# Patient Record
Sex: FEMALE | Race: OTHER | URBAN - METROPOLITAN AREA
[De-identification: names, ages, dates, MRNs, and addresses within clinical notes are randomized per-mention and may not be internally consistent; named-entity substitution may affect disease eponyms.]

---

## 2018-04-19 ENCOUNTER — EMERGENCY (EMERGENCY)
Facility: HOSPITAL | Age: 68
LOS: 1 days | Discharge: HOME | End: 2018-04-19
Attending: EMERGENCY MEDICINE | Admitting: EMERGENCY MEDICINE
Payer: MEDICAID

## 2018-04-19 VITALS
RESPIRATION RATE: 18 BRPM | DIASTOLIC BLOOD PRESSURE: 73 MMHG | SYSTOLIC BLOOD PRESSURE: 193 MMHG | OXYGEN SATURATION: 100 % | HEART RATE: 66 BPM | TEMPERATURE: 98 F

## 2018-04-19 PROCEDURE — 99285 EMERGENCY DEPT VISIT HI MDM: CPT | Mod: 25

## 2018-04-19 PROCEDURE — 99053 MED SERV 10PM-8AM 24 HR FAC: CPT

## 2018-04-19 NOTE — ED ADULT TRIAGE NOTE - LANGUAGE ASSISTANCE NEEDED
No-Patient/Caregiver offered and refused free interpretation services./triaged in primary language, family to translate

## 2018-04-19 NOTE — ED ADULT TRIAGE NOTE - CHIEF COMPLAINT QUOTE
Pt Yakut speaking, family to translate arrives c/o high BP, feeling tired, and pressure/tightness in chest, making it feel harder to breathe.  Also reports abd distention, bloating, nbnb vomiting 3x pta. Resps even/unlabored on RA, appearing in no distress. PMHx HTN, DM2, Gastritis. BP high in triage, took meds as rx'd.  EKG obtained

## 2018-04-20 VITALS
DIASTOLIC BLOOD PRESSURE: 59 MMHG | SYSTOLIC BLOOD PRESSURE: 185 MMHG | TEMPERATURE: 98 F | RESPIRATION RATE: 18 BRPM | OXYGEN SATURATION: 100 % | HEART RATE: 67 BPM

## 2018-04-20 LAB
ALBUMIN SERPL ELPH-MCNC: 4 G/DL — SIGNIFICANT CHANGE UP (ref 3.3–5)
ALP SERPL-CCNC: 101 U/L — SIGNIFICANT CHANGE UP (ref 40–120)
ALT FLD-CCNC: 23 U/L — SIGNIFICANT CHANGE UP (ref 4–33)
APPEARANCE UR: CLEAR — SIGNIFICANT CHANGE UP
AST SERPL-CCNC: 21 U/L — SIGNIFICANT CHANGE UP (ref 4–32)
BASE EXCESS BLDV CALC-SCNC: 5.4 MMOL/L — SIGNIFICANT CHANGE UP
BASOPHILS # BLD AUTO: 0.04 K/UL — SIGNIFICANT CHANGE UP (ref 0–0.2)
BASOPHILS NFR BLD AUTO: 0.7 % — SIGNIFICANT CHANGE UP (ref 0–2)
BILIRUB SERPL-MCNC: 0.5 MG/DL — SIGNIFICANT CHANGE UP (ref 0.2–1.2)
BILIRUB UR-MCNC: NEGATIVE — SIGNIFICANT CHANGE UP
BLOOD GAS VENOUS - CREATININE: 0.66 MG/DL — SIGNIFICANT CHANGE UP (ref 0.5–1.3)
BLOOD UR QL VISUAL: NEGATIVE — SIGNIFICANT CHANGE UP
BUN SERPL-MCNC: 17 MG/DL — SIGNIFICANT CHANGE UP (ref 7–23)
CALCIUM SERPL-MCNC: 9.6 MG/DL — SIGNIFICANT CHANGE UP (ref 8.4–10.5)
CHLORIDE BLDV-SCNC: 99 MMOL/L — SIGNIFICANT CHANGE UP (ref 96–108)
CHLORIDE SERPL-SCNC: 90 MMOL/L — LOW (ref 98–107)
CO2 SERPL-SCNC: 26 MMOL/L — SIGNIFICANT CHANGE UP (ref 22–31)
COLOR SPEC: SIGNIFICANT CHANGE UP
CREAT SERPL-MCNC: 0.62 MG/DL — SIGNIFICANT CHANGE UP (ref 0.5–1.3)
EOSINOPHIL # BLD AUTO: 0.23 K/UL — SIGNIFICANT CHANGE UP (ref 0–0.5)
EOSINOPHIL NFR BLD AUTO: 3.8 % — SIGNIFICANT CHANGE UP (ref 0–6)
GAS PNL BLDV: 127 MMOL/L — LOW (ref 136–146)
GLUCOSE BLDV-MCNC: 161 — HIGH (ref 70–99)
GLUCOSE SERPL-MCNC: 165 MG/DL — HIGH (ref 70–99)
GLUCOSE UR-MCNC: NEGATIVE — SIGNIFICANT CHANGE UP
HCO3 BLDV-SCNC: 27 MMOL/L — SIGNIFICANT CHANGE UP (ref 20–27)
HCT VFR BLD CALC: 35 % — SIGNIFICANT CHANGE UP (ref 34.5–45)
HCT VFR BLDV CALC: 37.2 % — SIGNIFICANT CHANGE UP (ref 34.5–45)
HGB BLD-MCNC: 11.4 G/DL — LOW (ref 11.5–15.5)
HGB BLDV-MCNC: 12.1 G/DL — SIGNIFICANT CHANGE UP (ref 11.5–15.5)
IMM GRANULOCYTES # BLD AUTO: 0.02 # — SIGNIFICANT CHANGE UP
IMM GRANULOCYTES NFR BLD AUTO: 0.3 % — SIGNIFICANT CHANGE UP (ref 0–1.5)
KETONES UR-MCNC: NEGATIVE — SIGNIFICANT CHANGE UP
LACTATE BLDV-MCNC: 0.8 MMOL/L — SIGNIFICANT CHANGE UP (ref 0.5–2)
LEUKOCYTE ESTERASE UR-ACNC: NEGATIVE — SIGNIFICANT CHANGE UP
LIDOCAIN IGE QN: 42 U/L — SIGNIFICANT CHANGE UP (ref 7–60)
LYMPHOCYTES # BLD AUTO: 1.73 K/UL — SIGNIFICANT CHANGE UP (ref 1–3.3)
LYMPHOCYTES # BLD AUTO: 28.3 % — SIGNIFICANT CHANGE UP (ref 13–44)
MCHC RBC-ENTMCNC: 27.6 PG — SIGNIFICANT CHANGE UP (ref 27–34)
MCHC RBC-ENTMCNC: 32.6 % — SIGNIFICANT CHANGE UP (ref 32–36)
MCV RBC AUTO: 84.7 FL — SIGNIFICANT CHANGE UP (ref 80–100)
MONOCYTES # BLD AUTO: 0.73 K/UL — SIGNIFICANT CHANGE UP (ref 0–0.9)
MONOCYTES NFR BLD AUTO: 11.9 % — SIGNIFICANT CHANGE UP (ref 2–14)
NEUTROPHILS # BLD AUTO: 3.36 K/UL — SIGNIFICANT CHANGE UP (ref 1.8–7.4)
NEUTROPHILS NFR BLD AUTO: 55 % — SIGNIFICANT CHANGE UP (ref 43–77)
NITRITE UR-MCNC: NEGATIVE — SIGNIFICANT CHANGE UP
NON-SQ EPI CELLS # UR AUTO: <1 — SIGNIFICANT CHANGE UP
NRBC # FLD: 0 — SIGNIFICANT CHANGE UP
NT-PROBNP SERPL-SCNC: 260.4 PG/ML — SIGNIFICANT CHANGE UP
PCO2 BLDV: 50 MMHG — SIGNIFICANT CHANGE UP (ref 41–51)
PH BLDV: 7.4 PH — SIGNIFICANT CHANGE UP (ref 7.32–7.43)
PH UR: 7 — SIGNIFICANT CHANGE UP (ref 4.6–8)
PLATELET # BLD AUTO: 217 K/UL — SIGNIFICANT CHANGE UP (ref 150–400)
PMV BLD: 10.4 FL — SIGNIFICANT CHANGE UP (ref 7–13)
PO2 BLDV: < 24 MMHG — LOW (ref 35–40)
POTASSIUM BLDV-SCNC: 4.4 MMOL/L — SIGNIFICANT CHANGE UP (ref 3.4–4.5)
POTASSIUM SERPL-MCNC: 4.6 MMOL/L — SIGNIFICANT CHANGE UP (ref 3.5–5.3)
POTASSIUM SERPL-SCNC: 4.6 MMOL/L — SIGNIFICANT CHANGE UP (ref 3.5–5.3)
PROT SERPL-MCNC: 7.5 G/DL — SIGNIFICANT CHANGE UP (ref 6–8.3)
PROT UR-MCNC: 20 MG/DL — SIGNIFICANT CHANGE UP
RBC # BLD: 4.13 M/UL — SIGNIFICANT CHANGE UP (ref 3.8–5.2)
RBC # FLD: 12.2 % — SIGNIFICANT CHANGE UP (ref 10.3–14.5)
RBC CASTS # UR COMP ASSIST: SIGNIFICANT CHANGE UP (ref 0–?)
SAO2 % BLDV: 29.4 % — LOW (ref 60–85)
SODIUM SERPL-SCNC: 126 MMOL/L — LOW (ref 135–145)
SP GR SPEC: 1.01 — SIGNIFICANT CHANGE UP (ref 1–1.04)
TROPONIN T SERPL-MCNC: < 0.06 NG/ML — SIGNIFICANT CHANGE UP (ref 0–0.06)
UROBILINOGEN FLD QL: NORMAL MG/DL — SIGNIFICANT CHANGE UP
WBC # BLD: 6.11 K/UL — SIGNIFICANT CHANGE UP (ref 3.8–10.5)
WBC # FLD AUTO: 6.11 K/UL — SIGNIFICANT CHANGE UP (ref 3.8–10.5)
WBC UR QL: SIGNIFICANT CHANGE UP (ref 0–?)

## 2018-04-20 PROCEDURE — 76705 ECHO EXAM OF ABDOMEN: CPT | Mod: 26

## 2018-04-20 RX ORDER — ONDANSETRON 8 MG/1
4 TABLET, FILM COATED ORAL ONCE
Qty: 0 | Refills: 0 | Status: COMPLETED | OUTPATIENT
Start: 2018-04-20 | End: 2018-04-20

## 2018-04-20 RX ORDER — FAMOTIDINE 10 MG/ML
20 INJECTION INTRAVENOUS ONCE
Qty: 0 | Refills: 0 | Status: COMPLETED | OUTPATIENT
Start: 2018-04-20 | End: 2018-04-20

## 2018-04-20 RX ADMIN — Medication 30 MILLILITER(S): at 03:39

## 2018-04-20 RX ADMIN — FAMOTIDINE 20 MILLIGRAM(S): 10 INJECTION INTRAVENOUS at 03:39

## 2018-04-20 RX ADMIN — ONDANSETRON 4 MILLIGRAM(S): 8 TABLET, FILM COATED ORAL at 03:39

## 2018-04-20 NOTE — ED PROVIDER NOTE - PHYSICAL EXAMINATION
Adri Cervantes DO.:   GENERAL: Patient awake alert NAD.  HEENT: Moist mucous membranes, EOMI  LUNGS: CTAB, no wheezes or crackles.   CARDIAC: RRR, no m/r/g.    ABDOMEN: Soft, +tender in epigastrium, ND, No rebound, guarding. No CVA tenderness.   EXT: No edema. No calf tenderness. CV 2+DP/PT bilaterally.   NEURO: A&Ox3. Gait normal.    SKIN: Warm and dry.

## 2018-04-20 NOTE — ED ADULT NURSE NOTE - OBJECTIVE STATEMENT
67F A&Ox4 received #9 Romansh speaking family preferred to translate.  Pt c/o 4 days of intermittent substernal chest pressure/epigastric pain.  Pain is non-radiating, non-exertional.  18G R ac

## 2018-04-20 NOTE — ED PROVIDER NOTE - CARE PLAN
Principal Discharge DX:	Chest pain  Assessment and plan of treatment:	1. TAKE ALL MEDICATIONS AS DIRECTED.    2. FOR PAIN OR FEVER YOU CAN TAKE IBUPROFEN (MOTRIN, ADVIL) OR ACETAMINOPHEN (TYLENOL) AS NEEDED, AS DIRECTED ON PACKAGING.  3. FOLLOW UP WITH YOUR PRIMARY DOCTOR WITHIN 5 DAYS AS DIRECTED.  4. IF YOU HAD LABS OR IMAGING DONE, YOU WERE GIVEN COPIES OF ALL LABS AND/OR IMAGING RESULTS FROM YOUR ER VISIT--PLEASE TAKE THEM WITH YOU TO YOUR FOLLOW UP APPOINTMENTS.  5. IF NEEDED, CALL PATIENT ACCESS SERVICES AT [phone] TO FIND A PRIMARY CARE PHYSICIAN.  OR CALL [phone] TO MAKE AN APPOINTMENT WITH THE CLINIC.  6. RETURN TO THE ER FOR ANY WORSENING SYMPTOMS OR CONCERNS.

## 2018-04-20 NOTE — ED PROVIDER NOTE - PROGRESS NOTE DETAILS
Resident, Blinder: patient feeling well after the pepcid/maalox. Is refusing to stay for second trop. Patient looks well, will send home with cardiology and GI referral lists. Resident, Johner: patient feeling well after the pepcid/maalox. Is refusing to stay for second trop, understands risks involved. Patient looks well, will send home with cardiology and GI referral lists.

## 2018-04-20 NOTE — ED PROVIDER NOTE - PLAN OF CARE
1. TAKE ALL MEDICATIONS AS DIRECTED.    2. FOR PAIN OR FEVER YOU CAN TAKE IBUPROFEN (MOTRIN, ADVIL) OR ACETAMINOPHEN (TYLENOL) AS NEEDED, AS DIRECTED ON PACKAGING.  3. FOLLOW UP WITH YOUR PRIMARY DOCTOR WITHIN 5 DAYS AS DIRECTED.  4. IF YOU HAD LABS OR IMAGING DONE, YOU WERE GIVEN COPIES OF ALL LABS AND/OR IMAGING RESULTS FROM YOUR ER VISIT--PLEASE TAKE THEM WITH YOU TO YOUR FOLLOW UP APPOINTMENTS.  5. IF NEEDED, CALL PATIENT ACCESS SERVICES AT [phone] TO FIND A PRIMARY CARE PHYSICIAN.  OR CALL [phone] TO MAKE AN APPOINTMENT WITH THE CLINIC.  6. RETURN TO THE ER FOR ANY WORSENING SYMPTOMS OR CONCERNS.

## 2018-04-20 NOTE — ED PROVIDER NOTE - MEDICAL DECISION MAKING DETAILS
67F p/w chest pain/SOB and N/V. ACS r/o vs. GERD vs. [place] vs. Pancreatitis. Unlikely HTN Urgency as lower BP, but will watch and reassess. D/c vs. admit for cardiac w/u.

## 2018-04-20 NOTE — ED PROVIDER NOTE - NS ED ROS FT
CONST: no fevers, no chills  EYES: no pain, no vision changes  ENT: no sore throat, no ear pain, no change in hearing  CV: +chest pain, no leg swelling  RESP: +shortness of breath, no cough  ABD: +abdominal pain, +nausea, +vomiting, no diarrhea  : no dysuria, no flank pain, no hematuria  MSK: no back pain, no extremity pain  NEURO: no headache or additional neurologic complaints  HEME: no easy bleeding  SKIN:  no rash

## 2018-04-20 NOTE — ED PROVIDER NOTE - OBJECTIVE STATEMENT
67F h/o HTN, DM presents with 4 days of intermittent chest pressure, non-radiating, non-exertional, associated with SOB and epigastric pain 67F h/o HTN, DM presents with 4 days of intermittent substernal chest pressure, non-radiating, non-exertional, associated with SOB and epigastric pain, non-radiating and NBNB vomit - one episode. No fevers, headache, leg swelling, diarrhea. No recent travel. 67F h/o HTN, DM presents with 4 days of intermittent substernal chest pressure, non-radiating, non-exertional, associated with SOB and epigastric pain, non-radiating and NBNB vomit - one episode. No fevers, headache, leg swelling, diarrhea. No recent travel.  Family translating at bedside, requested by patient. 67F h/o HTN, DM presents with 4 days of intermittent epigastric pain and substernal chest pain non-radiating, non-exertional, associated with NBNB vomit - one episode.  Sxs intermittent, worse after meals.  States it feels like her gastritis.  Pt used to follow with GI, last EGD 5 years ago, but has not seen them recently.  No fevers, headache, leg swelling, diarrhea. No recent travel.    Pt is primarily Estonian speaking.  Family translating at bedside, requested by patient.

## 2018-04-20 NOTE — ED PROVIDER NOTE - ATTENDING CONTRIBUTION TO CARE
66 y/o F with h/o HTN, DM, gastritis here with epigastric and chest pain.  Pt reports sxs intermittent x 4 days, worse after meals.  Associated with 1 episode of nbnb vomiting earlier today.  No fever, chills, cough, sob, back pain, palpitations, diarrhea, leg pain or swelling.  Pt does report chronic constipation, last BM earlier today.  She use to follow with GI for these sxs, but has not seen in a while.  Last EGD 5 years ago showing gastritis per family.  Well appearing, lying comfortably in stretcher, awake and alert, nontoxic.  VSS.  Lungs cta bl.  Cards nl S1/S2, RRR, no MRG.  Abd soft neg lockhart's sign ntnd.  No pedal edema or calf tenderness.  Plan for ekg, labs, cxr, ruq sono, gi cocktail, reassess.  Sxs consistent with gerd but given hx will also eval for cardiac etiology with xr/ekg/serial trops.  If neg plan for outpatient GI follow-up.

## 2018-08-06 PROBLEM — E11.9 TYPE 2 DIABETES MELLITUS WITHOUT COMPLICATIONS: Chronic | Status: ACTIVE | Noted: 2018-04-20

## 2018-08-06 PROBLEM — Z00.00 ENCOUNTER FOR PREVENTIVE HEALTH EXAMINATION: Status: ACTIVE | Noted: 2018-08-06

## 2018-08-06 PROBLEM — I10 ESSENTIAL (PRIMARY) HYPERTENSION: Chronic | Status: ACTIVE | Noted: 2018-04-20

## 2018-08-09 ENCOUNTER — APPOINTMENT (OUTPATIENT)
Dept: GASTROENTEROLOGY | Facility: CLINIC | Age: 68
End: 2018-08-09

## 2018-08-30 ENCOUNTER — APPOINTMENT (OUTPATIENT)
Dept: GASTROENTEROLOGY | Facility: CLINIC | Age: 68
End: 2018-08-30
Payer: MEDICARE

## 2018-08-30 VITALS
RESPIRATION RATE: 16 BRPM | SYSTOLIC BLOOD PRESSURE: 145 MMHG | HEART RATE: 80 BPM | TEMPERATURE: 98 F | WEIGHT: 129 LBS | BODY MASS INDEX: 24.35 KG/M2 | OXYGEN SATURATION: 96 % | HEIGHT: 61 IN | DIASTOLIC BLOOD PRESSURE: 88 MMHG

## 2018-08-30 DIAGNOSIS — Z86.39 PERSONAL HISTORY OF OTHER ENDOCRINE, NUTRITIONAL AND METABOLIC DISEASE: ICD-10-CM

## 2018-08-30 DIAGNOSIS — K31.89 OTHER DISEASES OF STOMACH AND DUODENUM: ICD-10-CM

## 2018-08-30 DIAGNOSIS — Z83.2 FAMILY HISTORY OF DISEASES OF THE BLOOD AND BLOOD-FORMING ORGANS AND CERTAIN DISORDERS INVOLVING THE IMMUNE MECHANISM: ICD-10-CM

## 2018-08-30 DIAGNOSIS — Z86.79 PERSONAL HISTORY OF OTHER DISEASES OF THE CIRCULATORY SYSTEM: ICD-10-CM

## 2018-08-30 DIAGNOSIS — R19.8 OTHER SPECIFIED SYMPTOMS AND SIGNS INVOLVING THE DIGESTIVE SYSTEM AND ABDOMEN: ICD-10-CM

## 2018-08-30 DIAGNOSIS — Z78.9 OTHER SPECIFIED HEALTH STATUS: ICD-10-CM

## 2018-08-30 PROCEDURE — 99204 OFFICE O/P NEW MOD 45 MIN: CPT

## 2018-08-30 RX ORDER — GLIMEPIRIDE 4 MG/1
4 TABLET ORAL
Refills: 0 | Status: ACTIVE | COMMUNITY

## 2018-08-30 RX ORDER — OMEPRAZOLE 40 MG/1
40 CAPSULE, DELAYED RELEASE ORAL
Refills: 0 | Status: ACTIVE | COMMUNITY

## 2018-08-30 RX ORDER — SODIUM SULFATE, POTASSIUM SULFATE, MAGNESIUM SULFATE 17.5; 3.13; 1.6 G/ML; G/ML; G/ML
17.5-3.13-1.6 SOLUTION, CONCENTRATE ORAL
Qty: 1 | Refills: 0 | Status: ACTIVE | COMMUNITY
Start: 2018-08-30 | End: 1900-01-01

## 2018-08-30 RX ORDER — CARVEDILOL 6.25 MG/1
6.25 TABLET, FILM COATED ORAL
Refills: 0 | Status: ACTIVE | COMMUNITY

## 2018-08-30 RX ORDER — UBIDECARENONE/VIT E ACET 100MG-5
25 MCG CAPSULE ORAL
Refills: 0 | Status: ACTIVE | COMMUNITY

## 2018-08-30 RX ORDER — LINACLOTIDE 290 UG/1
290 CAPSULE, GELATIN COATED ORAL
Qty: 30 | Refills: 3 | Status: ACTIVE | COMMUNITY
Start: 2018-08-30 | End: 1900-01-01

## 2018-09-01 ENCOUNTER — OUTPATIENT (OUTPATIENT)
Dept: OUTPATIENT SERVICES | Facility: HOSPITAL | Age: 68
LOS: 1 days | End: 2018-09-01

## 2018-09-06 ENCOUNTER — OUTPATIENT (OUTPATIENT)
Dept: OUTPATIENT SERVICES | Facility: HOSPITAL | Age: 68
LOS: 1 days | Discharge: HOME | End: 2018-09-06
Payer: MEDICARE

## 2018-09-06 ENCOUNTER — APPOINTMENT (OUTPATIENT)
Dept: GASTROENTEROLOGY | Facility: HOSPITAL | Age: 68
End: 2018-09-06

## 2018-09-06 ENCOUNTER — RESULT REVIEW (OUTPATIENT)
Age: 68
End: 2018-09-06

## 2018-09-06 DIAGNOSIS — K86.9 DISEASE OF PANCREAS, UNSPECIFIED: ICD-10-CM

## 2018-09-06 PROCEDURE — 88305 TISSUE EXAM BY PATHOLOGIST: CPT | Mod: 26

## 2018-09-06 PROCEDURE — 88173 CYTOPATH EVAL FNA REPORT: CPT | Mod: 26

## 2018-09-06 PROCEDURE — 43242 EGD US FINE NEEDLE BX/ASPIR: CPT | Mod: GC

## 2018-09-19 ENCOUNTER — APPOINTMENT (OUTPATIENT)
Dept: SURGICAL ONCOLOGY | Facility: CLINIC | Age: 68
End: 2018-09-19
Payer: MEDICARE

## 2018-09-19 VITALS
SYSTOLIC BLOOD PRESSURE: 167 MMHG | RESPIRATION RATE: 16 BRPM | BODY MASS INDEX: 24.35 KG/M2 | WEIGHT: 129 LBS | HEIGHT: 61 IN | HEART RATE: 58 BPM | OXYGEN SATURATION: 98 % | DIASTOLIC BLOOD PRESSURE: 81 MMHG

## 2018-09-19 DIAGNOSIS — K86.89 OTHER SPECIFIED DISEASES OF PANCREAS: ICD-10-CM

## 2018-09-19 DIAGNOSIS — Z80.1 FAMILY HISTORY OF MALIGNANT NEOPLASM OF TRACHEA, BRONCHUS AND LUNG: ICD-10-CM

## 2018-09-19 DIAGNOSIS — Z86.69 PERSONAL HISTORY OF OTHER DISEASES OF THE NERVOUS SYSTEM AND SENSE ORGANS: ICD-10-CM

## 2018-09-19 DIAGNOSIS — C25.9 MALIGNANT NEOPLASM OF PANCREAS, UNSPECIFIED: ICD-10-CM

## 2018-09-19 DIAGNOSIS — Z80.3 FAMILY HISTORY OF MALIGNANT NEOPLASM OF BREAST: ICD-10-CM

## 2018-09-19 DIAGNOSIS — R63.4 ABNORMAL WEIGHT LOSS: ICD-10-CM

## 2018-09-19 DIAGNOSIS — Z80.0 FAMILY HISTORY OF MALIGNANT NEOPLASM OF DIGESTIVE ORGANS: ICD-10-CM

## 2018-09-19 DIAGNOSIS — Z87.19 PERSONAL HISTORY OF OTHER DISEASES OF THE DIGESTIVE SYSTEM: ICD-10-CM

## 2018-09-19 DIAGNOSIS — Z87.39 PERSONAL HISTORY OF OTHER DISEASES OF THE MUSCULOSKELETAL SYSTEM AND CONNECTIVE TISSUE: ICD-10-CM

## 2018-09-19 DIAGNOSIS — K30 FUNCTIONAL DYSPEPSIA: ICD-10-CM

## 2018-09-19 DIAGNOSIS — Z87.448 PERSONAL HISTORY OF OTHER DISEASES OF URINARY SYSTEM: ICD-10-CM

## 2018-09-19 PROCEDURE — 99205 OFFICE O/P NEW HI 60 MIN: CPT

## 2018-09-19 RX ORDER — HAEMOPHILUS B CONJUGATE VACCINE (MENINGOCOCCAL PROTEIN CONJUGATE) 7.5 UG/.5ML
INJECTION, SUSPENSION INTRAMUSCULAR
Qty: 1 | Refills: 0 | Status: ACTIVE | COMMUNITY
Start: 2018-09-19 | End: 1900-01-01

## 2018-09-19 RX ORDER — OXYCODONE AND ACETAMINOPHEN 5; 325 MG/1; MG/1
5-325 TABLET ORAL
Qty: 30 | Refills: 0 | Status: ACTIVE | COMMUNITY
Start: 2018-09-19 | End: 1900-01-01

## 2018-09-25 ENCOUNTER — FORM ENCOUNTER (OUTPATIENT)
Age: 68
End: 2018-09-25

## 2018-09-26 ENCOUNTER — INPATIENT (INPATIENT)
Facility: HOSPITAL | Age: 68
LOS: 0 days | Discharge: HOME | End: 2018-09-27
Attending: HOSPITALIST | Admitting: HOSPITALIST
Payer: MEDICARE

## 2018-09-26 ENCOUNTER — OUTPATIENT (OUTPATIENT)
Dept: OUTPATIENT SERVICES | Facility: HOSPITAL | Age: 68
LOS: 1 days | End: 2018-09-26
Payer: MEDICARE

## 2018-09-26 ENCOUNTER — APPOINTMENT (OUTPATIENT)
Dept: CT IMAGING | Facility: IMAGING CENTER | Age: 68
End: 2018-09-26
Payer: MEDICARE

## 2018-09-26 VITALS
RESPIRATION RATE: 16 BRPM | DIASTOLIC BLOOD PRESSURE: 87 MMHG | SYSTOLIC BLOOD PRESSURE: 158 MMHG | HEART RATE: 82 BPM | OXYGEN SATURATION: 100 % | TEMPERATURE: 97 F

## 2018-09-26 DIAGNOSIS — I10 ESSENTIAL (PRIMARY) HYPERTENSION: ICD-10-CM

## 2018-09-26 DIAGNOSIS — K86.9 DISEASE OF PANCREAS, UNSPECIFIED: ICD-10-CM

## 2018-09-26 DIAGNOSIS — R52 PAIN, UNSPECIFIED: ICD-10-CM

## 2018-09-26 DIAGNOSIS — E11.9 TYPE 2 DIABETES MELLITUS WITHOUT COMPLICATIONS: ICD-10-CM

## 2018-09-26 DIAGNOSIS — E87.1 HYPO-OSMOLALITY AND HYPONATREMIA: ICD-10-CM

## 2018-09-26 DIAGNOSIS — C25.9 MALIGNANT NEOPLASM OF PANCREAS, UNSPECIFIED: ICD-10-CM

## 2018-09-26 DIAGNOSIS — Z29.9 ENCOUNTER FOR PROPHYLACTIC MEASURES, UNSPECIFIED: ICD-10-CM

## 2018-09-26 DIAGNOSIS — G89.3 NEOPLASM RELATED PAIN (ACUTE) (CHRONIC): ICD-10-CM

## 2018-09-26 LAB
ALBUMIN SERPL ELPH-MCNC: 4.2 G/DL — SIGNIFICANT CHANGE UP (ref 3.3–5)
ALP SERPL-CCNC: 92 U/L — SIGNIFICANT CHANGE UP (ref 40–120)
ALT FLD-CCNC: 15 U/L — SIGNIFICANT CHANGE UP (ref 4–33)
APPEARANCE UR: CLEAR — SIGNIFICANT CHANGE UP
AST SERPL-CCNC: 18 U/L — SIGNIFICANT CHANGE UP (ref 4–32)
BACTERIA # UR AUTO: NEGATIVE — SIGNIFICANT CHANGE UP
BASE EXCESS BLDV CALC-SCNC: 3.2 MMOL/L — SIGNIFICANT CHANGE UP
BASOPHILS # BLD AUTO: 0.11 K/UL — SIGNIFICANT CHANGE UP (ref 0–0.2)
BASOPHILS NFR BLD AUTO: 1.1 % — SIGNIFICANT CHANGE UP (ref 0–2)
BILIRUB SERPL-MCNC: 0.5 MG/DL — SIGNIFICANT CHANGE UP (ref 0.2–1.2)
BILIRUB UR-MCNC: NEGATIVE — SIGNIFICANT CHANGE UP
BLOOD GAS VENOUS - CREATININE: 0.4 MG/DL — LOW (ref 0.5–1.3)
BLOOD UR QL VISUAL: NEGATIVE — SIGNIFICANT CHANGE UP
BUN SERPL-MCNC: 10 MG/DL — SIGNIFICANT CHANGE UP (ref 7–23)
BUN SERPL-MCNC: 8 MG/DL — SIGNIFICANT CHANGE UP (ref 7–23)
CALCIUM SERPL-MCNC: 9.4 MG/DL — SIGNIFICANT CHANGE UP (ref 8.4–10.5)
CALCIUM SERPL-MCNC: 9.9 MG/DL — SIGNIFICANT CHANGE UP (ref 8.4–10.5)
CHLORIDE BLDV-SCNC: 91 MMOL/L — LOW (ref 96–108)
CHLORIDE SERPL-SCNC: 87 MMOL/L — LOW (ref 98–107)
CHLORIDE SERPL-SCNC: 94 MMOL/L — LOW (ref 98–107)
CHLORIDE UR-SCNC: 59 MMOL/L — SIGNIFICANT CHANGE UP
CO2 SERPL-SCNC: 24 MMOL/L — SIGNIFICANT CHANGE UP (ref 22–31)
CO2 SERPL-SCNC: 24 MMOL/L — SIGNIFICANT CHANGE UP (ref 22–31)
COLOR SPEC: SIGNIFICANT CHANGE UP
CORTIS SERPL-MCNC: 10.1 UG/DL — SIGNIFICANT CHANGE UP (ref 2.7–18.4)
CREAT SERPL-MCNC: 0.5 MG/DL — SIGNIFICANT CHANGE UP (ref 0.5–1.3)
CREAT SERPL-MCNC: 0.52 MG/DL — SIGNIFICANT CHANGE UP (ref 0.5–1.3)
EOSINOPHIL # BLD AUTO: 1.14 K/UL — HIGH (ref 0–0.5)
EOSINOPHIL NFR BLD AUTO: 11.2 % — HIGH (ref 0–6)
GAS PNL BLDV: 124 MMOL/L — LOW (ref 136–146)
GLUCOSE BLDC GLUCOMTR-MCNC: 167 MG/DL — HIGH (ref 70–99)
GLUCOSE BLDC GLUCOMTR-MCNC: 210 MG/DL — HIGH (ref 70–99)
GLUCOSE BLDV-MCNC: 181 — HIGH (ref 70–99)
GLUCOSE SERPL-MCNC: 111 MG/DL — HIGH (ref 70–99)
GLUCOSE SERPL-MCNC: 178 MG/DL — HIGH (ref 70–99)
GLUCOSE UR-MCNC: NEGATIVE — SIGNIFICANT CHANGE UP
HCO3 BLDV-SCNC: 26 MMOL/L — SIGNIFICANT CHANGE UP (ref 20–27)
HCT VFR BLD CALC: 34.3 % — LOW (ref 34.5–45)
HCT VFR BLDV CALC: 37.5 % — SIGNIFICANT CHANGE UP (ref 34.5–45)
HGB BLD-MCNC: 11.8 G/DL — SIGNIFICANT CHANGE UP (ref 11.5–15.5)
HGB BLDV-MCNC: 12.2 G/DL — SIGNIFICANT CHANGE UP (ref 11.5–15.5)
HYALINE CASTS # UR AUTO: NEGATIVE — SIGNIFICANT CHANGE UP
IMM GRANULOCYTES # BLD AUTO: 0.03 # — SIGNIFICANT CHANGE UP
IMM GRANULOCYTES NFR BLD AUTO: 0.3 % — SIGNIFICANT CHANGE UP (ref 0–1.5)
KETONES UR-MCNC: SIGNIFICANT CHANGE UP
LACTATE BLDV-MCNC: 1.1 MMOL/L — SIGNIFICANT CHANGE UP (ref 0.5–2)
LEUKOCYTE ESTERASE UR-ACNC: NEGATIVE — SIGNIFICANT CHANGE UP
LIDOCAIN IGE QN: 46.5 U/L — SIGNIFICANT CHANGE UP (ref 7–60)
LYMPHOCYTES # BLD AUTO: 1.26 K/UL — SIGNIFICANT CHANGE UP (ref 1–3.3)
LYMPHOCYTES # BLD AUTO: 12.3 % — LOW (ref 13–44)
MCHC RBC-ENTMCNC: 27.9 PG — SIGNIFICANT CHANGE UP (ref 27–34)
MCHC RBC-ENTMCNC: 34.4 % — SIGNIFICANT CHANGE UP (ref 32–36)
MCV RBC AUTO: 81.1 FL — SIGNIFICANT CHANGE UP (ref 80–100)
MONOCYTES # BLD AUTO: 0.9 K/UL — SIGNIFICANT CHANGE UP (ref 0–0.9)
MONOCYTES NFR BLD AUTO: 8.8 % — SIGNIFICANT CHANGE UP (ref 2–14)
NEUTROPHILS # BLD AUTO: 6.77 K/UL — SIGNIFICANT CHANGE UP (ref 1.8–7.4)
NEUTROPHILS NFR BLD AUTO: 66.3 % — SIGNIFICANT CHANGE UP (ref 43–77)
NITRITE UR-MCNC: NEGATIVE — SIGNIFICANT CHANGE UP
NRBC # FLD: 0 — SIGNIFICANT CHANGE UP
OSMOLALITY SERPL: 270 MOSMO/KG — LOW (ref 275–295)
OSMOLALITY UR: 319 MOSMO/KG — SIGNIFICANT CHANGE UP (ref 50–1200)
PCO2 BLDV: 49 MMHG — SIGNIFICANT CHANGE UP (ref 41–51)
PH BLDV: 7.37 PH — SIGNIFICANT CHANGE UP (ref 7.32–7.43)
PH UR: 8 — SIGNIFICANT CHANGE UP (ref 5–8)
PLATELET # BLD AUTO: 306 K/UL — SIGNIFICANT CHANGE UP (ref 150–400)
PMV BLD: 10 FL — SIGNIFICANT CHANGE UP (ref 7–13)
PO2 BLDV: 27 MMHG — LOW (ref 35–40)
POTASSIUM BLDV-SCNC: 5.4 MMOL/L — HIGH (ref 3.4–4.5)
POTASSIUM SERPL-MCNC: 4.2 MMOL/L — SIGNIFICANT CHANGE UP (ref 3.5–5.3)
POTASSIUM SERPL-MCNC: 4.8 MMOL/L — SIGNIFICANT CHANGE UP (ref 3.5–5.3)
POTASSIUM SERPL-SCNC: 4.2 MMOL/L — SIGNIFICANT CHANGE UP (ref 3.5–5.3)
POTASSIUM SERPL-SCNC: 4.8 MMOL/L — SIGNIFICANT CHANGE UP (ref 3.5–5.3)
POTASSIUM UR-SCNC: 23 MMOL/L — SIGNIFICANT CHANGE UP
PROT SERPL-MCNC: 8.2 G/DL — SIGNIFICANT CHANGE UP (ref 6–8.3)
PROT UR-MCNC: 30 — SIGNIFICANT CHANGE UP
RBC # BLD: 4.23 M/UL — SIGNIFICANT CHANGE UP (ref 3.8–5.2)
RBC # FLD: 12.4 % — SIGNIFICANT CHANGE UP (ref 10.3–14.5)
RBC CASTS # UR COMP ASSIST: SIGNIFICANT CHANGE UP (ref 0–?)
SAO2 % BLDV: 41.9 % — LOW (ref 60–85)
SODIUM SERPL-SCNC: 124 MMOL/L — LOW (ref 135–145)
SODIUM SERPL-SCNC: 130 MMOL/L — LOW (ref 135–145)
SODIUM UR-SCNC: 71 MMOL/L — SIGNIFICANT CHANGE UP
SP GR SPEC: 1.04 — SIGNIFICANT CHANGE UP (ref 1–1.04)
SQUAMOUS # UR AUTO: SIGNIFICANT CHANGE UP
TSH SERPL-MCNC: 1.11 UIU/ML — SIGNIFICANT CHANGE UP (ref 0.27–4.2)
UROBILINOGEN FLD QL: NORMAL — SIGNIFICANT CHANGE UP
WBC # BLD: 10.21 K/UL — SIGNIFICANT CHANGE UP (ref 3.8–10.5)
WBC # FLD AUTO: 10.21 K/UL — SIGNIFICANT CHANGE UP (ref 3.8–10.5)
WBC UR QL: SIGNIFICANT CHANGE UP (ref 0–?)

## 2018-09-26 PROCEDURE — 71260 CT THORAX DX C+: CPT | Mod: 26

## 2018-09-26 PROCEDURE — 74177 CT ABD & PELVIS W/CONTRAST: CPT

## 2018-09-26 PROCEDURE — 74177 CT ABD & PELVIS W/CONTRAST: CPT | Mod: 26

## 2018-09-26 PROCEDURE — 99223 1ST HOSP IP/OBS HIGH 75: CPT | Mod: GC

## 2018-09-26 PROCEDURE — 82565 ASSAY OF CREATININE: CPT

## 2018-09-26 PROCEDURE — 99223 1ST HOSP IP/OBS HIGH 75: CPT

## 2018-09-26 PROCEDURE — 71260 CT THORAX DX C+: CPT

## 2018-09-26 RX ORDER — ONDANSETRON 8 MG/1
4 TABLET, FILM COATED ORAL ONCE
Qty: 0 | Refills: 0 | Status: COMPLETED | OUTPATIENT
Start: 2018-09-26 | End: 2018-09-26

## 2018-09-26 RX ORDER — ENOXAPARIN SODIUM 100 MG/ML
40 INJECTION SUBCUTANEOUS EVERY 24 HOURS
Qty: 0 | Refills: 0 | Status: DISCONTINUED | OUTPATIENT
Start: 2018-09-26 | End: 2018-09-27

## 2018-09-26 RX ORDER — PANTOPRAZOLE SODIUM 20 MG/1
1 TABLET, DELAYED RELEASE ORAL
Qty: 0 | Refills: 0 | COMMUNITY

## 2018-09-26 RX ORDER — SODIUM CHLORIDE 9 MG/ML
1000 INJECTION, SOLUTION INTRAVENOUS
Qty: 0 | Refills: 0 | Status: DISCONTINUED | OUTPATIENT
Start: 2018-09-26 | End: 2018-09-27

## 2018-09-26 RX ORDER — INSULIN LISPRO 100/ML
VIAL (ML) SUBCUTANEOUS AT BEDTIME
Qty: 0 | Refills: 0 | Status: DISCONTINUED | OUTPATIENT
Start: 2018-09-26 | End: 2018-09-27

## 2018-09-26 RX ORDER — HYDROMORPHONE HYDROCHLORIDE 2 MG/ML
0.5 INJECTION INTRAMUSCULAR; INTRAVENOUS; SUBCUTANEOUS ONCE
Qty: 0 | Refills: 0 | Status: DISCONTINUED | OUTPATIENT
Start: 2018-09-26 | End: 2018-09-26

## 2018-09-26 RX ORDER — DEXTROSE 50 % IN WATER 50 %
12.5 SYRINGE (ML) INTRAVENOUS ONCE
Qty: 0 | Refills: 0 | Status: DISCONTINUED | OUTPATIENT
Start: 2018-09-26 | End: 2018-09-27

## 2018-09-26 RX ORDER — SODIUM CHLORIDE 9 MG/ML
1000 INJECTION INTRAMUSCULAR; INTRAVENOUS; SUBCUTANEOUS ONCE
Qty: 0 | Refills: 0 | Status: COMPLETED | OUTPATIENT
Start: 2018-09-26 | End: 2018-09-26

## 2018-09-26 RX ORDER — INSULIN LISPRO 100/ML
VIAL (ML) SUBCUTANEOUS
Qty: 0 | Refills: 0 | Status: DISCONTINUED | OUTPATIENT
Start: 2018-09-26 | End: 2018-09-27

## 2018-09-26 RX ORDER — HYDROMORPHONE HYDROCHLORIDE 2 MG/ML
0.5 INJECTION INTRAMUSCULAR; INTRAVENOUS; SUBCUTANEOUS EVERY 6 HOURS
Qty: 0 | Refills: 0 | Status: DISCONTINUED | OUTPATIENT
Start: 2018-09-26 | End: 2018-09-26

## 2018-09-26 RX ORDER — GLUCAGON INJECTION, SOLUTION 0.5 MG/.1ML
1 INJECTION, SOLUTION SUBCUTANEOUS ONCE
Qty: 0 | Refills: 0 | Status: DISCONTINUED | OUTPATIENT
Start: 2018-09-26 | End: 2018-09-27

## 2018-09-26 RX ORDER — ONDANSETRON 8 MG/1
4 TABLET, FILM COATED ORAL EVERY 6 HOURS
Qty: 0 | Refills: 0 | Status: DISCONTINUED | OUTPATIENT
Start: 2018-09-26 | End: 2018-09-27

## 2018-09-26 RX ORDER — CARVEDILOL PHOSPHATE 80 MG/1
1 CAPSULE, EXTENDED RELEASE ORAL
Qty: 0 | Refills: 0 | COMMUNITY

## 2018-09-26 RX ORDER — GLIMEPIRIDE 1 MG
1 TABLET ORAL
Qty: 0 | Refills: 0 | COMMUNITY

## 2018-09-26 RX ORDER — OXYCODONE AND ACETAMINOPHEN 5; 325 MG/1; MG/1
1 TABLET ORAL EVERY 6 HOURS
Qty: 0 | Refills: 0 | Status: DISCONTINUED | OUTPATIENT
Start: 2018-09-26 | End: 2018-09-26

## 2018-09-26 RX ORDER — CARVEDILOL PHOSPHATE 80 MG/1
12.5 CAPSULE, EXTENDED RELEASE ORAL EVERY 12 HOURS
Qty: 0 | Refills: 0 | Status: DISCONTINUED | OUTPATIENT
Start: 2018-09-26 | End: 2018-09-26

## 2018-09-26 RX ORDER — DEXTROSE 50 % IN WATER 50 %
15 SYRINGE (ML) INTRAVENOUS ONCE
Qty: 0 | Refills: 0 | Status: DISCONTINUED | OUTPATIENT
Start: 2018-09-26 | End: 2018-09-27

## 2018-09-26 RX ORDER — MORPHINE SULFATE 50 MG/1
4 CAPSULE, EXTENDED RELEASE ORAL ONCE
Qty: 0 | Refills: 0 | Status: DISCONTINUED | OUTPATIENT
Start: 2018-09-26 | End: 2018-09-26

## 2018-09-26 RX ORDER — ENOXAPARIN SODIUM 100 MG/ML
10 INJECTION SUBCUTANEOUS
Qty: 0 | Refills: 0 | COMMUNITY

## 2018-09-26 RX ORDER — CARVEDILOL PHOSPHATE 80 MG/1
12.5 CAPSULE, EXTENDED RELEASE ORAL EVERY 12 HOURS
Qty: 0 | Refills: 0 | Status: DISCONTINUED | OUTPATIENT
Start: 2018-09-26 | End: 2018-09-27

## 2018-09-26 RX ORDER — DOCUSATE SODIUM 100 MG
100 CAPSULE ORAL THREE TIMES A DAY
Qty: 0 | Refills: 0 | Status: DISCONTINUED | OUTPATIENT
Start: 2018-09-26 | End: 2018-09-27

## 2018-09-26 RX ORDER — ERGOCALCIFEROL 1.25 MG/1
1 CAPSULE ORAL
Qty: 0 | Refills: 0 | COMMUNITY

## 2018-09-26 RX ORDER — HYDROMORPHONE HYDROCHLORIDE 2 MG/ML
0.5 INJECTION INTRAMUSCULAR; INTRAVENOUS; SUBCUTANEOUS THREE TIMES A DAY
Qty: 0 | Refills: 0 | Status: DISCONTINUED | OUTPATIENT
Start: 2018-09-26 | End: 2018-09-26

## 2018-09-26 RX ORDER — ACETAMINOPHEN 500 MG
650 TABLET ORAL EVERY 6 HOURS
Qty: 0 | Refills: 0 | Status: DISCONTINUED | OUTPATIENT
Start: 2018-09-26 | End: 2018-09-27

## 2018-09-26 RX ORDER — DEXTROSE 50 % IN WATER 50 %
25 SYRINGE (ML) INTRAVENOUS ONCE
Qty: 0 | Refills: 0 | Status: DISCONTINUED | OUTPATIENT
Start: 2018-09-26 | End: 2018-09-27

## 2018-09-26 RX ORDER — LINACLOTIDE 145 UG/1
1 CAPSULE, GELATIN COATED ORAL
Qty: 0 | Refills: 0 | COMMUNITY

## 2018-09-26 RX ORDER — PANTOPRAZOLE SODIUM 20 MG/1
40 TABLET, DELAYED RELEASE ORAL
Qty: 0 | Refills: 0 | Status: DISCONTINUED | OUTPATIENT
Start: 2018-09-26 | End: 2018-09-27

## 2018-09-26 RX ORDER — OXYCODONE HYDROCHLORIDE 5 MG/1
5 TABLET ORAL EVERY 6 HOURS
Qty: 0 | Refills: 0 | Status: DISCONTINUED | OUTPATIENT
Start: 2018-09-26 | End: 2018-09-27

## 2018-09-26 RX ADMIN — MORPHINE SULFATE 4 MILLIGRAM(S): 50 CAPSULE, EXTENDED RELEASE ORAL at 12:13

## 2018-09-26 RX ADMIN — SODIUM CHLORIDE 1000 MILLILITER(S): 9 INJECTION INTRAMUSCULAR; INTRAVENOUS; SUBCUTANEOUS at 12:13

## 2018-09-26 RX ADMIN — HYDROMORPHONE HYDROCHLORIDE 0.5 MILLIGRAM(S): 2 INJECTION INTRAMUSCULAR; INTRAVENOUS; SUBCUTANEOUS at 17:57

## 2018-09-26 RX ADMIN — Medication 1: at 19:42

## 2018-09-26 RX ADMIN — ONDANSETRON 4 MILLIGRAM(S): 8 TABLET, FILM COATED ORAL at 17:57

## 2018-09-26 RX ADMIN — HYDROMORPHONE HYDROCHLORIDE 0.5 MILLIGRAM(S): 2 INJECTION INTRAMUSCULAR; INTRAVENOUS; SUBCUTANEOUS at 18:27

## 2018-09-26 RX ADMIN — PANTOPRAZOLE SODIUM 40 MILLIGRAM(S): 20 TABLET, DELAYED RELEASE ORAL at 19:34

## 2018-09-26 RX ADMIN — MORPHINE SULFATE 4 MILLIGRAM(S): 50 CAPSULE, EXTENDED RELEASE ORAL at 11:18

## 2018-09-26 RX ADMIN — ONDANSETRON 4 MILLIGRAM(S): 8 TABLET, FILM COATED ORAL at 11:18

## 2018-09-26 RX ADMIN — SODIUM CHLORIDE 1000 MILLILITER(S): 9 INJECTION INTRAMUSCULAR; INTRAVENOUS; SUBCUTANEOUS at 11:18

## 2018-09-26 RX ADMIN — Medication 100 MILLIGRAM(S): at 22:14

## 2018-09-26 NOTE — H&P ADULT - HISTORY OF PRESENT ILLNESS
69 yo F with PMH of HTN, DM, constipation, recently diagnosed pancreatic cancer (9/6/2018) presenting from [facility] for LLQ pain. Patient went to [facility] for her f/u care with  for a CT scanand complained of severe LLQ pain. The LLQ pain started 1 month ago but progressively worsened. It's worsened by eating, and turning and lying on the right side. It's alleviated by lying on her left side. Patient has no N/V, no diarrhea or loose stools, no fevers/chills, no cp, has chronic constipation, last BM 2 days ago. Patient was very functional prior to this, but starting 1 month ago, she started getting more weak, and fatigued. Patient has history of shingles 3 years ago with postherpetic neuralgia in the left diaphragm area with some scars.

## 2018-09-26 NOTE — H&P ADULT - PROBLEM SELECTOR PLAN 1
- Pt in a lot of pain   - s/p dilaudid at 11AM, still in a lot of pain, 0.5 dilaudid given at 5pm. -Neoplastic Pain, progressive LLQ pain   - s/p dilaudid at 11AM, still in a lot of pain, IV dilaudid 0.5 given  - on PRN tylenol 650 Q6 for mild pain  - on PRN oxycodone 5mg Q6 for moderate pain  - on PRN Dilaudid 0.5 Q6 for severe pain. -Neoplastic Pain, progressive LLQ pain   - s/p dilaudid at 11AM, still in a lot of pain, IV dilaudid 0.5 given  - on PRN tylenol 650 Q6 for mild pain  - on PRN oxycodone 5mg Q6 for moderate pain  - on PRN Dilaudid 0.5 Q6 for severe pain.  - on PRN zofran for nausea/vomiting

## 2018-09-26 NOTE — H&P ADULT - NSHPREVIEWOFSYSTEMS_GEN_ALL_CORE
Gen: Denies fevers, chills   HEENT: Denies oral ulcers   CV: Denies chest pain, palpitations   Resp: Denies SOB   Abd: Denies abdominal pain, N/V, melena  Skin: Denies rashes  Ext: Denies edema  Neuro: Denies headaches, visual changes  Psych: Denies depression  Endo: Denies heat intolerance

## 2018-09-26 NOTE — ED ADULT NURSE NOTE - NS ED NOTE  TALK SOMEONE YN
LOW-SALT DIET (2 grams/day)   This diet eliminates foods that are high in salt and restricts the amount of salt that you cook with. It is most often used for patients with high blood pressure, edema (fluid retention), kidney, liver, and heart disease.   Table salt contains the mineral sodium. The body needs a little bit of sodium to work normally. But too much sodium can make your health problems worse. Your total daily allowance of salt (sodium) is 2 grams. This equals 2000 milligrams (mg). This is about a teaspoon of salt. This means you can have only about 500 mg of sodium at each meal. Most individuals get excessive sodium from snacks. Look carefully for sodium levels at or around 250 mg per serving and do not eat more than 1 serving. Really low-sodium snacks contain less than 100 mg per serving.      When you cook, limit the salt you use. And if you can avoid using salt, even better. Do not add salt at the table. So, throw away the saltshaker!   When shopping, read the package labels. Salt is often called sodium on the label. Choose foods that are Salt-Free, Low Salt, or Very Low Salt. Note that foods with Reduced Salt may not lower your salt intake enough.     BEVERAGES OK: Tea, coffee, carbonated beverages, juices   AVOID: Flavored international coffees, electrolyte replacement drinks, sports beverages     BREAD & CEREALS OK: All regular bread, rolls, cereals, cakes; low-salt crackers, matzoh crackers   AVOID: Salted crackers, pretzels, popcorn; Egyptian toast, pancakes, muffins     FRUITS & DESSERTS OK: Ice cream, frozen yogurt, juice bars, gelatin (Jell-O), cookies and pies, sugar, honey, jelly, hard candy   AVOID: Most pies, cakes and cookies prepared or processed with salt, instant pudding     MEATS OK: All fresh meat, fish, poultry, low-salt tuna   AVOID: Smoked, pickled, brine-cured, or salted meats or fish. This includes bo, chipped beef, corned beef, hot dogs, luncheon meats, ham, kosher meats,  salt pork, sausage, canned tuna, salted codfish, smoked salmon, herring, sardines, or anchovies.     DAIRY OK: Milk, chocolate milk, hot chocolate mix; eggs, Low Salt cheeses, yogurt, egg substitute   AVOID: Processed cheese, cheese spreads, Roquefort, Camembert, and cottage cheese, buttermilk, instant breakfast drink     BEANS, POTATOES & PASTA OK: Dry beans, split peas, lentils, potatoes, rice, macaroni, noodles, spaghetti without added salt   AVOID: Potato chips, tortilla chips, and similar products     SOUPS OK: Low-salt soups and broths made with allowed foods   AVOID: Bouillon cubes, soups with smoked or salted meats, regular soup and broth     VEGETABLES OK: Most are okay; low-salt tomato and vegetable juices   AVOID: Sauerkraut and other brine-soaked vegetables, pickles and other pickled vegetables, tomato juice, olives       SEASONING & SPICES: OK: Most seasonings are okay. Good substitutes for salt include: fresh herb blends, Tabasco, lemon, garlic, cardoza, vinegar, dry mustard, parsley, cilantro, horseradish, tomato paste, regular margarine, mayonnaise, butter, cream cheese, vegetable oil, cream, low-salt salad dressing and gravy   AVOID: Regular ketchup, relishes, pickles, soy sauce, teriyaki sauce, Worcestershire sauce, BBQ sauce, tartar sauce, meat tenderizer, chili sauce, regular gravy, regular salad dressing   © 5523-2718 [facility], [address] [place] [address], [place] [address]. All rights reserved. This information is not intended as a substitute for professional medical care. Always follow your healthcare professional's instructions.     No

## 2018-09-26 NOTE — H&P ADULT - PROBLEM SELECTOR PLAN 3
- [facility] first time visit was today  -   - Will follow up  for surgical options, but pt's daughter saying no surgery was offered - pt with recent diagnosis of pancreatic tail cancer w stomach involvement w metastasis to lymph node.  - Will follow up  for surgery scheduling/options

## 2018-09-26 NOTE — H&P ADULT - NSHPLABSRESULTS_GEN_ALL_CORE
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    Metastatic retroperitoneal lymphadenopathy.    < end of copied text >    Cytopathology - Non Gyn Report (09.06.18 @ 18:01)    Cytopathology - Non Gyn Report:   ACCESSION No:  [id number]    ELIZABETH DAXA VILLA ANA ROSALUCIANA            2        Cytopathology Report    Final Diagnosis  PANCREAS, ENDOSCOPIC ULTRASOUND-GUIDED (EUS) FNA -  POSITIVE FOR MALIGNANT CELLS.  Adenocarcinoma.    - Cytology slide and cell block display a hypercellular specimen  composed of three dimensional clusters, loosely cohesive sheets  and scattered single lining malignant epithelial cells. These  cells exhibit pleomorphic irregular nuclei with prominent  nucleoli, coarse chromatin  and vacuolated cytoplasm along with  atypical parakeratotic squamoid cells with hyperchromatic nuclei  and dense cytoplasm; in a background of blood, stromal component  and necrosis.  The cytomorphology findings are consistent with  adenocarcinoma with squamous features.    - Correlation with clinical presentation and radiologic studies  is essential.  - Case discussed at the daily cytopathology   conference on 09/10/18.  - Dr. Valle was informed of the diagnosis viaencrypted email  on 09/10/18.  - Case reported to Tumor Registry.

## 2018-09-26 NOTE — ED PROVIDER NOTE - MEDICAL DECISION MAKING DETAILS
69 yo F with PMHx of DM , HTN and recently dx pancreatic ca x 1 week ago presents to the ED c/o abdominal pain, LUQ radiates down left side, intermittently worse with eating or drinking food, nausea x 1 month worsening over the past 3 days.   Plan: labs, ua, surg cs

## 2018-09-26 NOTE — H&P ADULT - PROBLEM SELECTOR PLAN 5
On coreg at home  - will f/u w her PMD about the choice of coreg and her medical conditions  - will start coreg for now.

## 2018-09-26 NOTE — H&P ADULT - NSHPPHYSICALEXAM_GEN_ALL_CORE
Gen: Tearful, in pain, lying on her left side  HEENT: moist mucus membranes, no scleral icterus  CV: +S1S2 RRR no murmurs, rubs or gallops  Chest: CTA bilaterally, no wheezing, or crackles   Abd: BS+ in all 4 quadrants, Soft, TTP in LLQ and tender in LUQ near the shingles scar   Ext: no pedal edema  Neuro: AAOx3, New Zealander speaking  Skin: old shingles rash in the left diaphragm/LUQ

## 2018-09-26 NOTE — H&P ADULT - ASSESSMENT
67 yo F with PMH of HTN, DM, constipation, recently diagnosed pancreatic cancer (9/6/2018) presenting with LLQ pain.

## 2018-09-26 NOTE — CONSULT NOTE ADULT - SUBJECTIVE AND OBJECTIVE BOX
Patient seen and examined. Full note to follow. HISTORY OF PRESENT ILLNESS:  68F w/ DM, HTN, newly diagnosed pancreatic adenocarcinoma presents to ED with 1 month abdominal pain worsening acutely over last 3 days. Patient reports that she has had significant abdominal pain for the last month, prior to her diagnosis, which is worsened with eating to the point that she does not tolerate food well. She states that in the last three days, she has been having worsening pain in the epigastric region and has not been able to eat at all or sleep. She reports that she has no fevers, chills, nausea, vomiting, diarrhea, constipation, or more weight loss. She is very concerned that the pain is interfering with her life. She saw Dr. Crocker in clinic 1 week ago and at that time, felt that the pain was tolerable. This AM, patient underwent staging CT C/A/P to evaluate for metastatic burden of disease. Surgery is consulted for worsening abdominal pain in the setting of pancreatic adenocarcinoma.     PAST MEDICAL HISTORY:   Pancreatic cancer  DM (diabetes mellitus)  HTN (hypertension)    PAST SURGICAL HISTORY: No significant past surgical history    FAMILY HISTORY: No pertinent family history in first degree relatives    SOCIAL HISTORY: lives at home with daughter, primary caregiver    CODE STATUS:     HOME MEDICATIONS:  Home Medications:  acetaminophen 325 mg oral tablet: 2 tab(s) orally every 4 hours, As Needed for pain  (26 Sep 2018 14:56)  carvedilol 12.5 mg oral tablet: 1 tab(s) orally 2 times a day (26 Sep 2018 14:56)  glimepiride 4 mg oral tablet: 1 tab(s) orally once a day (26 Sep 2018 14:56)  Lantus Solostar Pen 100 units/mL subcutaneous solution: 10 unit(s) subcutaneous once a day (at bedtime) (26 Sep 2018 14:56)  Linzess 290 mcg oral capsule: 1 cap(s) orally once a day (26 Sep 2018 14:56)  Protonix 40 mg oral delayed release tablet: 1 tab(s) orally 2 times a day (26 Sep 2018 14:56)  Vitamin D2 50,000 intl units (1.25 mg) oral capsule: 1 cap(s) orally once a week (26 Sep 2018 14:56)    ALLERGIES: NSAIDs (Short breath)    VITAL SIGNS:  ICU Vital Signs Last 24 Hrs  T(C): 36.8 (26 Sep 2018 23:30), Max: 36.8 (26 Sep 2018 23:30)  T(F): 98.3 (26 Sep 2018 23:30), Max: 98.3 (26 Sep 2018 23:30)  HR: 87 (26 Sep 2018 23:30) (79 - 87)  BP: 151/72 (26 Sep 2018 23:30) (146/68 - 177/58)  BP(mean): --  ABP: --  ABP(mean): --  RR: 18 (26 Sep 2018 23:30) (16 - 18)  SpO2: 100% (26 Sep 2018 23:30) (97% - 100%)    General: acutely uncomfortable, lying in bed  Resp: breathing well on RA  CV: HDS  GI: soft, diffusely distended and tender, nontympanitic, no rebound/guarding           Extr: wwp               11.8   10.21 )-----------( 306      ( 26 Sep 2018 10:35 )             34.3     OTHER MEDICATIONS:     CT C/A/P    IMAGING STUDIES:  LUNGS AND LARGE AIRWAYS: Patent central airways. No pulmonary nodules.      PLEURA: No pleural effusion.  VESSELS: Within normal limits.  HEART: Heart size is normal.  No pericardial effusion.      MEDIASTINUM AND HAMLET: No lymphadenopathy.   CHEST WALL AND LOWER NECK: Within normal limits.     ABDOMEN AND PELVIS:    LIVER: Within normal limits.   BILE DUCTS: Normal caliber.  GALLBLADDER: Within normal limits.  SPLEEN: Within normal limits.   PANCREAS: A 5 x 4 cm pancreatic tail mass is inseparable from the gastric   body superiorly. The splenic vein is obliterated. The splenic artery is   encased with stenosis. Perigastric collaterals. No involvement of the   celiac artery, SMA, SMV, or portal vein.  ADRENALS: Within normal limits.   KIDNEYS/URETERS: Subcentimeter lesions too small to characterize.     BLADDER: Within normal limits.   REPRODUCTIVE ORGANS: Hysterectomy. No adnexal mass.     BOWEL: No bowel obstruction.              PERITONEUM: No ascites.   VESSELS:  Within normal limits.  RETROPERITONEUM: Necrotic retroperitoneal lymphadenopathy. A 2 x 1.7 cm   left periaortic node on image 83.    ABDOMINAL WALL: Fat-containing umbilical hernia.  BONES: Within normal limits.    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    Metastatic retroperitoneal lymphadenopathy.    LUIS WINSTON M.D., ATTENDING RADIOLOGIST

## 2018-09-26 NOTE — ED ADULT NURSE NOTE - OBJECTIVE STATEMENT
Pt c/o Pt c/o RUQ and R flank pain, dx last week with Pancreatic CA, states today pain is worse. Endorses vomiting this AM, denies current nausea or other acute complaints including fevers/ chills/ dysuria/ diarrhea/ CP/ SOB. Pt appears uncomfortable. VSS.

## 2018-09-26 NOTE — ED PROVIDER NOTE - PROGRESS NOTE DETAILS
WILLIAM Metcalf: Spoke with surgery, who evaluated pt, reviewed Ct scan, and discussed case with Dr. Crocker. Scan showing new mets, however does not change patient surgical management. Plan to admit to medicine for pain control. also hyponatremia. taylor: pt found to be hyponatremic , no surgical findings on CT. plan for admission for hyponatremia, pain control.

## 2018-09-26 NOTE — ED PROVIDER NOTE - CARE PLAN
Principal Discharge DX:	Pain  Secondary Diagnosis:	Hyponatremia  Secondary Diagnosis:	Pancreatic cancer

## 2018-09-26 NOTE — CONSULT NOTE ADULT - ASSESSMENT
68F w/ DM, HTN, newly diagnosed pancreatic adenocarcinoma presents to ED with 1 month abdominal pain worsening acutely over last 3 days. Surgery is consulted for worsening abdominal pain in the setting of pancreatic adenocarcinoma. Patient known to have large cancer burden and as per most recent CT scan, has developed significant metastases with tumor inseparable from the stomach. There are no interventions available acutely for improvement of pain and given etiology of pain likely 2/2 cancer burden, patient requires oncological pain control. Recommend admission to medicine service for pain control. Surgery will continue to follow.     Octavia Correia, PGY-2  Surgery pager [phone]

## 2018-09-26 NOTE — ED PROVIDER NOTE - OBJECTIVE STATEMENT
Translation preferred and provided by pt daughter Kamilla Fortune  67 yo F with PMHx of DM , HTN and recently dx pancreatic ca x 1 week ago presents to the ED c/o abdominal pain, LUQ radiates down left side, intermittently worse with eating or drinking food, nausea x 1 month worsening over the past 3 days. Denies fever, chills, vomiting, cp ,sob, diarrhea, hematuria, dysuria, vaginal complaints, dizziness, lightheadedness, syncope, melena, BRBPR.    GI: Dr. Brunner  Surg: Dr. Crocker Translation preferred and provided by pt daughter Kamilla Fortune  69 yo F with PMHx of DM , HTN and recently dx pancreatic ca x 1 week ago presents to the ED c/o abdominal pain, LUQ radiates down left side, intermittently worse with eating or drinking food, nausea x 1 month worsening over the past 3 days. Denies fever, chills, vomiting, cp ,sob, diarrhea, hematuria, dysuria, vaginal complaints, dizziness, lightheadedness, syncope, melena, BRBPR.  As per pt daughter pt had CT chest abd, pelvis done this morning at [address].   GI: Dr. Brunner  Surg: Dr. Crocker

## 2018-09-26 NOTE — ED ADULT NURSE NOTE - NSIMPLEMENTINTERV_GEN_ALL_ED
Implemented All Universal Safety Interventions:  [place] to call system. Call bell, personal items and telephone within reach. Instruct patient to call for assistance. Room bathroom lighting operational. Non-slip footwear when patient is off stretcher. Physically safe environment: no spills, clutter or unnecessary equipment. Stretcher in lowest position, wheels locked, appropriate side rails in place.

## 2018-09-26 NOTE — H&P ADULT - PROBLEM SELECTOR PLAN 2
likely secondary to pain,  serum osm is low, urine osm is 319, hypotonic euvolemic hyponatremia  - Patient had Na 126 back in April 2018  - Pain control with dilaudid  - likely secondary to pain,  serum osm is low, urine osm is 319, hypotonic euvolemic hyponatremia  - Patient had Na 126 back in April 2018, likely chronic hyponatremia   - Pain control with dilaudid  - s/p IVF, responded to Na 124->130. Likely some component of dehydration and poor po intake.  - IVF stopped, will check BMP overnight to monitor Na.

## 2018-09-26 NOTE — ED PROVIDER NOTE - ATTENDING CONTRIBUTION TO CARE
Dr. Carl: I performed a face to face bedside interview with patient regarding history of present illness, review of symptoms and past medical history. I completed an independent physical exam.  I have discussed patient's plan of care with PA.   I agree with note as stated above, having amended the EMR as needed to reflect my findings.   This includes HISTORY OF PRESENT ILLNESS, HIV, PAST MEDICAL/SURGICAL/FAMILY/SOCIAL HISTORY, ALLERGIES AND HOME MEDICATIONS, REVIEW OF SYSTEMS, PHYSICAL EXAM, and any PROGRESS NOTES during the time I functioned as the attending physician for this patient.    pt p/w left sided abdominal pain, severe, sent to ED straight after gettting CT a/p. pt with recent diagnosis of pancreatic ca, no f/u with oncology yet.  on exam uncomfortable, TTP at LUQ,  afebrile    pt known to surgery team. will check labs, obtain CT  resuts, pain control.

## 2018-09-27 ENCOUNTER — TRANSCRIPTION ENCOUNTER (OUTPATIENT)
Age: 68
End: 2018-09-27

## 2018-09-27 VITALS
TEMPERATURE: 98 F | RESPIRATION RATE: 18 BRPM | SYSTOLIC BLOOD PRESSURE: 145 MMHG | OXYGEN SATURATION: 100 % | DIASTOLIC BLOOD PRESSURE: 75 MMHG | HEART RATE: 80 BPM

## 2018-09-27 DIAGNOSIS — Z71.89 OTHER SPECIFIED COUNSELING: ICD-10-CM

## 2018-09-27 LAB
BUN SERPL-MCNC: 13 MG/DL — SIGNIFICANT CHANGE UP (ref 7–23)
CALCIUM SERPL-MCNC: 9.4 MG/DL — SIGNIFICANT CHANGE UP (ref 8.4–10.5)
CHLORIDE SERPL-SCNC: 93 MMOL/L — LOW (ref 98–107)
CO2 SERPL-SCNC: 24 MMOL/L — SIGNIFICANT CHANGE UP (ref 22–31)
CREAT SERPL-MCNC: 0.61 MG/DL — SIGNIFICANT CHANGE UP (ref 0.5–1.3)
GLUCOSE BLDC GLUCOMTR-MCNC: 189 MG/DL — HIGH (ref 70–99)
GLUCOSE SERPL-MCNC: 135 MG/DL — HIGH (ref 70–99)
HBA1C BLD-MCNC: 9.6 % — HIGH (ref 4–5.6)
HCT VFR BLD CALC: 31.9 % — LOW (ref 34.5–45)
HGB BLD-MCNC: 10.6 G/DL — LOW (ref 11.5–15.5)
MAGNESIUM SERPL-MCNC: 2 MG/DL — SIGNIFICANT CHANGE UP (ref 1.6–2.6)
MCHC RBC-ENTMCNC: 27 PG — SIGNIFICANT CHANGE UP (ref 27–34)
MCHC RBC-ENTMCNC: 33.2 % — SIGNIFICANT CHANGE UP (ref 32–36)
MCV RBC AUTO: 81.4 FL — SIGNIFICANT CHANGE UP (ref 80–100)
NRBC # FLD: 0 — SIGNIFICANT CHANGE UP
PHOSPHATE SERPL-MCNC: 3.8 MG/DL — SIGNIFICANT CHANGE UP (ref 2.5–4.5)
PLATELET # BLD AUTO: 251 K/UL — SIGNIFICANT CHANGE UP (ref 150–400)
PMV BLD: 9.5 FL — SIGNIFICANT CHANGE UP (ref 7–13)
POTASSIUM SERPL-MCNC: 4.3 MMOL/L — SIGNIFICANT CHANGE UP (ref 3.5–5.3)
POTASSIUM SERPL-SCNC: 4.3 MMOL/L — SIGNIFICANT CHANGE UP (ref 3.5–5.3)
RBC # BLD: 3.92 M/UL — SIGNIFICANT CHANGE UP (ref 3.8–5.2)
RBC # FLD: 12.6 % — SIGNIFICANT CHANGE UP (ref 10.3–14.5)
SODIUM SERPL-SCNC: 130 MMOL/L — LOW (ref 135–145)
WBC # BLD: 7.69 K/UL — SIGNIFICANT CHANGE UP (ref 3.8–10.5)
WBC # FLD AUTO: 7.69 K/UL — SIGNIFICANT CHANGE UP (ref 3.8–10.5)

## 2018-09-27 PROCEDURE — 99239 HOSP IP/OBS DSCHRG MGMT >30: CPT

## 2018-09-27 PROCEDURE — 99232 SBSQ HOSP IP/OBS MODERATE 35: CPT

## 2018-09-27 RX ORDER — ACETAMINOPHEN 500 MG
2 TABLET ORAL
Qty: 0 | Refills: 0 | COMMUNITY

## 2018-09-27 RX ORDER — SODIUM CHLORIDE 9 MG/ML
1000 INJECTION INTRAMUSCULAR; INTRAVENOUS; SUBCUTANEOUS
Qty: 0 | Refills: 0 | Status: DISCONTINUED | OUTPATIENT
Start: 2018-09-27 | End: 2018-09-27

## 2018-09-27 RX ORDER — ONDANSETRON 8 MG/1
1 TABLET, FILM COATED ORAL
Qty: 90 | Refills: 0 | OUTPATIENT
Start: 2018-09-27 | End: 2018-10-26

## 2018-09-27 RX ORDER — INFLUENZA VIRUS VACCINE 15; 15; 15; 15 UG/.5ML; UG/.5ML; UG/.5ML; UG/.5ML
0.5 SUSPENSION INTRAMUSCULAR ONCE
Qty: 0 | Refills: 0 | Status: DISCONTINUED | OUTPATIENT
Start: 2018-09-27 | End: 2018-09-27

## 2018-09-27 RX ORDER — ONDANSETRON 8 MG/1
1 TABLET, FILM COATED ORAL
Qty: 21 | Refills: 0 | OUTPATIENT
Start: 2018-09-27 | End: 2018-10-03

## 2018-09-27 RX ORDER — OXYCODONE HYDROCHLORIDE 5 MG/1
1 TABLET ORAL
Qty: 28 | Refills: 0 | OUTPATIENT
Start: 2018-09-27 | End: 2018-10-03

## 2018-09-27 RX ORDER — ACETAMINOPHEN 500 MG
2 TABLET ORAL
Qty: 0 | Refills: 0 | COMMUNITY
Start: 2018-09-27

## 2018-09-27 RX ORDER — ACETAMINOPHEN 500 MG
2 TABLET ORAL
Qty: 56 | Refills: 0 | OUTPATIENT
Start: 2018-09-27 | End: 2018-10-03

## 2018-09-27 RX ADMIN — PANTOPRAZOLE SODIUM 40 MILLIGRAM(S): 20 TABLET, DELAYED RELEASE ORAL at 06:04

## 2018-09-27 RX ADMIN — CARVEDILOL PHOSPHATE 12.5 MILLIGRAM(S): 80 CAPSULE, EXTENDED RELEASE ORAL at 17:16

## 2018-09-27 RX ADMIN — ENOXAPARIN SODIUM 40 MILLIGRAM(S): 100 INJECTION SUBCUTANEOUS at 13:42

## 2018-09-27 RX ADMIN — Medication 1: at 17:16

## 2018-09-27 RX ADMIN — CARVEDILOL PHOSPHATE 12.5 MILLIGRAM(S): 80 CAPSULE, EXTENDED RELEASE ORAL at 06:04

## 2018-09-27 RX ADMIN — Medication 100 MILLIGRAM(S): at 06:04

## 2018-09-27 RX ADMIN — Medication 650 MILLIGRAM(S): at 07:20

## 2018-09-27 RX ADMIN — Medication 1: at 08:30

## 2018-09-27 RX ADMIN — SODIUM CHLORIDE 75 MILLILITER(S): 9 INJECTION INTRAMUSCULAR; INTRAVENOUS; SUBCUTANEOUS at 06:24

## 2018-09-27 RX ADMIN — Medication 650 MILLIGRAM(S): at 06:25

## 2018-09-27 RX ADMIN — OXYCODONE HYDROCHLORIDE 5 MILLIGRAM(S): 5 TABLET ORAL at 15:59

## 2018-09-27 RX ADMIN — OXYCODONE HYDROCHLORIDE 5 MILLIGRAM(S): 5 TABLET ORAL at 15:30

## 2018-09-27 RX ADMIN — Medication 100 MILLIGRAM(S): at 13:42

## 2018-09-27 NOTE — DISCHARGE NOTE ADULT - CARE PLAN
Principal Discharge DX:	Pancreatic cancer  Goal:	To relieve pain  Assessment and plan of treatment:	You came in to the hospital with severe left belly pain. We treated you with strong pain medications, and anti nausea medications, and you got better. You were able to eat, your left belly pain got better. We will send you home with pain medications and antinausea medications, please take them as needed for your pain. You can take the antinausea medications before taking your pain medications to prevent the side effects of the pain medication. If you have any worsening pain, nausea, vomiting, or fevers/chills, please come back to the emergency room.  Secondary Diagnosis:	Hyponatremia  Assessment and plan of treatment:	You were admitted to the hospital and your sodium was found to be very low on your blood level. You had been low in the past, and we treated you with some fluids. Your sodium got better and we stopped the fluids. If you have any seizures, dizziness, nausea/vomiting, severe headaches, confusion, please come back to the emergency room. Principal Discharge DX:	Pancreatic cancer  Goal:	To relieve pain  Assessment and plan of treatment:	You came in to the hospital with severe left belly pain. We treated you with strong pain medications, and anti nausea medications, and you got better. You were able to eat, your left belly pain got better. We will send you home with pain medications and antinausea medications, please take them as needed for your pain. You can take the antinausea medications before taking your pain medications to prevent the side effects of the pain medication. If you have any worsening pain, nausea, vomiting, or fevers/chills, please come back to the emergency room. Please take tylenol for mild pain, and oxycodone for moderate to severe pain, and follow up with your oncologist,  within 1 week.  Secondary Diagnosis:	Hyponatremia  Assessment and plan of treatment:	You were admitted to the hospital and your sodium was found to be very low on your blood level. You had been low in the past, and we treated you with some fluids. Your sodium got better and we stopped the fluids. If you have any seizures, dizziness, nausea/vomiting, severe headaches, confusion, please come back to the emergency room.

## 2018-09-27 NOTE — DISCHARGE NOTE ADULT - HOSPITAL COURSE
68 year old F with PMH of HTN, DM, with recently diagnosed pancreatic cancer, coming from [facility] for LLQ pain. Pt was having severe LLQ pain associated with N/V. We treated her with IV dilaudid x2, zofran, gave fluids and she improved. Patient was also found to be hyponatremic to 124, which improved to 130 with fluids. Pt was hyponatremic to 126 back in april 2018. Patient was seen by surgical oncology team, and they deemed her not a candidate for surgery. Patient was set up with medical oncology outpatient but patient's family wanted to take her to oncologist that they knew. Patient was discharged with percocet and zofran disintegrating tablets.

## 2018-09-27 NOTE — PROGRESS NOTE ADULT - ASSESSMENT
Assessment:  68F w/ DM, HTN, newly diagnosed pancreatic adenocarcinoma presents to ED with 1 month abdominal pain worsening acutely over last 3 days. Surgery is consulted for worsening abdominal pain in the setting of pancreatic adenocarcinoma. Patient known to have large cancer burden and as per most recent CT scan, has developed significant metastases with tumor inseparable from the stomach. There are no interventions available acutely for improvement of pain and given etiology of pain likely 2/2 cancer burden, patient requires oncological pain control.     Plan:  - No urgent surgical interventions   - Will review imaging for further evaluation and consideration of surgical options, if any available  - Treat pain as necessary    Sanchez Diggs, PGY-2  D Team Surgery [id number]

## 2018-09-27 NOTE — DISCHARGE NOTE ADULT - PROVIDER TOKENS
FREE:[LAST:[Wojciech],FIRST:[David],PHONE:[phone]],FAX:[(   [phone]    -],ADDRESS:[[address] [place] [address]]]

## 2018-09-27 NOTE — PROGRESS NOTE ADULT - ATTENDING COMMENTS
D/w pt and daughter at bedside this AM.    Given RP disease seen on CT, if is doubtful that surgical resection will be of benefit.  Wouls ask Med Onc to see pt and consider chemo.    Consider pain/pall care consult for analgesia management if needed
Pt seen and examined. Improved back pain and nausea,    Neoplasm related Pain: improving, plan to d/c on prn antiemetic and oxycodone    Pancreatic cancer: given mets on retroperitoneal LN, discussed with Dr. Crocker, plan for systemic chemo, no surgery. Daughter at bedside prefers to follow up at [facility] ( family is already in touch with the centre, they are running the pt's insurance) .  [facility] Onc Fellow notified for appointment just in case they change their mind.    Plan for dc with Onc follow up outpt.  Spent 40 mins on dc

## 2018-09-27 NOTE — PROGRESS NOTE ADULT - PROBLEM SELECTOR PLAN 1
-Neoplastic Pain, progressive LLQ pain   - s/p dilaudid at 11AM, still in a lot of pain, IV dilaudid 0.5 given  - on PRN tylenol 650 Q6 for mild pain  - on PRN oxycodone 5mg Q6 for moderate pain  - on PRN Dilaudid 0.5 Q6 for severe pain.  - on PRN zofran for nausea/vomiting -Neoplastic Pain, progressive LLQ pain   - s/p dilaudid at 11AM, still in a lot of pain, IV dilaudid 0.5 given  - on PRN tylenol 650 Q6 for mild pain  - on PRN oxycodone 5mg Q6 for moderate pain  - on PRN Dilaudid 0.5 Q6 for severe pain.  - on PRN zofran for nausea/vomiting  - will send home w zofran and percocet

## 2018-09-27 NOTE — PROGRESS NOTE ADULT - SUBJECTIVE AND OBJECTIVE BOX
Surgery Progress Note    S: Patient seen and examined. No acute events overnight.     O:  Vital Signs Last 24 Hrs  T(C): 36.6 (27 Sep 2018 06:02), Max: 36.8 (26 Sep 2018 23:30)  T(F): 97.9 (27 Sep 2018 06:02), Max: 98.3 (26 Sep 2018 23:30)  HR: 82 (27 Sep 2018 06:02) (79 - 87)  BP: 159/65 (27 Sep 2018 06:02) (146/68 - 177/58)  BP(mean): --  RR: 17 (27 Sep 2018 06:02) (16 - 18)  SpO2: 100% (27 Sep 2018 06:02) (97% - 100%)    I&O's Detail      MEDICATIONS  (STANDING):  carvedilol 12.5 milliGRAM(s) Oral every 12 hours  dextrose 5%. 1000 milliLiter(s) (50 mL/Hr) IV Continuous <Continuous>  dextrose 50% Injectable 12.5 Gram(s) IV Push once  dextrose 50% Injectable 25 Gram(s) IV Push once  dextrose 50% Injectable 25 Gram(s) IV Push once  docusate sodium 100 milliGRAM(s) Oral three times a day  enoxaparin Injectable 40 milliGRAM(s) SubCutaneous every 24 hours  influenza   Vaccine 0.5 milliLiter(s) IntraMuscular once  insulin lispro (HumaLOG) corrective regimen sliding scale   SubCutaneous three times a day before meals  insulin lispro (HumaLOG) corrective regimen sliding scale   SubCutaneous at bedtime  pantoprazole    Tablet 40 milliGRAM(s) Oral before breakfast  sodium chloride 0.9%. 1000 milliLiter(s) (75 mL/Hr) IV Continuous <Continuous>    MEDICATIONS  (PRN):  acetaminophen   Tablet .. 650 milliGRAM(s) Oral every 6 hours PRN Mild Pain (1 - 3)  dextrose 40% Gel 15 Gram(s) Oral once PRN Blood Glucose LESS THAN 70 milliGRAM(s)/deciliter  glucagon  Injectable 1 milliGRAM(s) IntraMuscular once PRN Glucose LESS THAN 70 milligrams/deciliter  ondansetron Injectable 4 milliGRAM(s) IV Push every 6 hours PRN Nausea and/or Vomiting  oxyCODONE    IR 5 milliGRAM(s) Oral every 6 hours PRN Moderate Pain (4 - 6)                            10.6   7.69  )-----------( 251      ( 27 Sep 2018 05:00 )             31.9       09-27    130<L>  |  93<L>  |  13  ----------------------------<  135<H>  4.3   |  24  |  0.61    Ca    9.4      27 Sep 2018 05:00  Phos  3.8     09-27  Mg     2.0     09-27    TPro  8.2  /  Alb  4.2  /  TBili  0.5  /  DBili  x   /  AST  18  /  ALT  15  /  AlkPhos  92  09-26      Physical Exam:  Gen: Laying in bed, NAD  Resp: Unlabored breathing  Abd: soft, mild tenderness along the left abdominal wall, non-distended. No rebound or guarding.  Ext: WWP  Skin: No rashes

## 2018-09-27 NOTE — DISCHARGE NOTE ADULT - PATIENT PORTAL LINK FT
Detail Level: Detailed Detail Level: Zone You can access the [facility] Patient Portal, offered by [facility], by registering with the following website: [email or web address]

## 2018-09-27 NOTE — PROGRESS NOTE ADULT - ASSESSMENT
67 yo F with PMH of HTN, DM, constipation, recently diagnosed pancreatic cancer (9/6/2018) presenting with LLQ pain. 69 yo F with PMH of HTN, DM, constipation, recently diagnosed pancreatic cancer (9/6/2018) presenting with LLQ pain, likely neoplastic pain

## 2018-09-27 NOTE — PROGRESS NOTE ADULT - PROBLEM SELECTOR PLAN 3
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  - F/u Dr. Crocker outpatient once pain is controlled. 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  - nonsurgical candidate  - onc consulted, pt will f/u w outpatient oncologist on her own.

## 2018-09-27 NOTE — DISCHARGE NOTE ADULT - MEDICATION SUMMARY - MEDICATIONS TO TAKE
I will START or STAY ON the medications listed below when I get home from the hospital:    oxyCODONE 5 mg oral tablet  -- 1 tab(s) by mouth every 6 hours, As needed, Moderate Pain (4 - 6) MDD:4 pills  -- Indication: For Pain    acetaminophen 325 mg oral tablet  -- 2 tab(s) by mouth every 6 hours, As needed, Mild Pain (1 - 3)  -- Indication: For Pain    glimepiride 4 mg oral tablet  -- 1 tab(s) by mouth once a day  -- Indication: For DM (diabetes mellitus)    Lantus Solostar Pen 100 units/mL subcutaneous solution  -- 10 unit(s) subcutaneous once a day (at bedtime)  -- Indication: For DM (diabetes mellitus)    Zofran ODT 4 mg oral tablet, disintegrating  -- 1 tab(s) by mouth 3 times a day  -- Indication: For Need for prophylactic measure    carvedilol 12.5 mg oral tablet  -- 1 tab(s) by mouth 2 times a day  -- Indication: For HTN (hypertension)    Linzess 290 mcg oral capsule  -- 1 cap(s) by mouth once a day  -- Indication: For Need for prophylactic measure    Protonix 40 mg oral delayed release tablet  -- 1 tab(s) by mouth 2 times a day  -- Indication: For Neoplasm related pain (acute) (chronic)    Vitamin D2 50,000 intl units (1.25 mg) oral capsule  -- 1 cap(s) by mouth once a week  -- Indication: For Need for prophylactic measure

## 2018-09-27 NOTE — DISCHARGE NOTE ADULT - OTHER SIGNIFICANT FINDINGS
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    Metastatic retroperitoneal lymphadenopathy.        < end of copied text >

## 2018-09-27 NOTE — DISCHARGE NOTE ADULT - PLAN OF CARE
To relieve pain You came in to the hospital with severe left belly pain. We treated you with strong pain medications, and anti nausea medications, and you got better. You were able to eat, your left belly pain got better. We will send you home with pain medications and antinausea medications, please take them as needed for your pain. You can take the antinausea medications before taking your pain medications to prevent the side effects of the pain medication. If you have any worsening pain, nausea, vomiting, or fevers/chills, please come back to the emergency room. You were admitted to the hospital and your sodium was found to be very low on your blood level. You had been low in the past, and we treated you with some fluids. Your sodium got better and we stopped the fluids. If you have any seizures, dizziness, nausea/vomiting, severe headaches, confusion, please come back to the emergency room. You came in to the hospital with severe left belly pain. We treated you with strong pain medications, and anti nausea medications, and you got better. You were able to eat, your left belly pain got better. We will send you home with pain medications and antinausea medications, please take them as needed for your pain. You can take the antinausea medications before taking your pain medications to prevent the side effects of the pain medication. If you have any worsening pain, nausea, vomiting, or fevers/chills, please come back to the emergency room. Please take tylenol for mild pain, and oxycodone for moderate to severe pain, and follow up with your oncologist,  within 1 week.

## 2018-09-27 NOTE — PROGRESS NOTE ADULT - PROBLEM SELECTOR PLAN 5
On coreg at home  - will f/u w her PMD about the choice of coreg and her medical conditions  - will start coreg for now. On coreg at home  - coreg

## 2018-09-27 NOTE — DISCHARGE NOTE ADULT - ADDITIONAL INSTRUCTIONS
Please follow up with Oncologist within 1 week. Please follow up with Oncologist, , within 1 week.  Please take tylenol for mild pain, oxycodone for moderate to severe pain.

## 2018-09-27 NOTE — PROGRESS NOTE ADULT - PROBLEM SELECTOR PLAN 2
likely secondary to pain,  serum osm is low, urine osm is 319, hypotonic euvolemic hyponatremia  - Patient had Na 126 back in April 2018, likely chronic hyponatremia   - Pain control with dilaudid  - s/p IVF, responded to Na 124->130. Likely some component of dehydration and poor po intake.  - IVF stopped, repeat BMP about 12 hours later was stable at 130, restarted fluids at 75cc . likely secondary to pain,  serum osm is low, urine osm is 319, hypotonic euvolemic hyponatremia  - Patient had Na 126 back in April 2018, likely chronic hyponatremia   - Pain control with dilaudid  - s/p IVF, responded to Na 124->130. Likely some component of dehydration and poor po intake.  - IVF stopped, repeat BMP about 12 hours later was stable at 130, and stable at 130  - likely chronic. likely secondary to pain,  serum osm is low, urine osm is 319, hypotonic euvolemic hyponatremia  - Patient had Na 126 back in April 2018, likely chronic hyponatremia   - s/p IVF, responded to Na 124->130. Likely some component of dehydration and poor po intake.  - IVF stopped, repeat BMP about 12 hours later was stable at 130, and stable at 130  - likely chronic.

## 2018-09-27 NOTE — PROGRESS NOTE ADULT - SUBJECTIVE AND OBJECTIVE BOX
Ella Suyeon Yu PGY-1   [facility] Pager # [phone]  NS Pager # [phone]      Patient is a 68y old  Female who presents with a chief complaint of Abdominal Pain (26 Sep 2018 22:55)      SUBJECTIVE: No acute events overnight. Patient seen and examined at bedside.    REVIEW OF SYSTEMS:  CONSTITUTIONAL: No fever, weight loss, or fatigue  RESPIRATORY: No cough or shortness of breath  CARDIOVASCULAR: No chest pain, palpitations, dizziness, or leg swelling  GASTROINTESTINAL: No abdominal or epigastric pain. No nausea, vomiting, or hematemesis; No diarrhea or constipation. No melena or hematochezia.  GENITOURINARY: No dysuria  NEUROLOGICAL: No headaches  SKIN: No itching or lesions       MEDICATIONS  (STANDING):  carvedilol 12.5 milliGRAM(s) Oral every 12 hours  dextrose 5%. 1000 milliLiter(s) (50 mL/Hr) IV Continuous <Continuous>  dextrose 50% Injectable 12.5 Gram(s) IV Push once  dextrose 50% Injectable 25 Gram(s) IV Push once  dextrose 50% Injectable 25 Gram(s) IV Push once  docusate sodium 100 milliGRAM(s) Oral three times a day  enoxaparin Injectable 40 milliGRAM(s) SubCutaneous every 24 hours  influenza   Vaccine 0.5 milliLiter(s) IntraMuscular once  insulin lispro (HumaLOG) corrective regimen sliding scale   SubCutaneous three times a day before meals  insulin lispro (HumaLOG) corrective regimen sliding scale   SubCutaneous at bedtime  pantoprazole    Tablet 40 milliGRAM(s) Oral before breakfast  sodium chloride 0.9%. 1000 milliLiter(s) (75 mL/Hr) IV Continuous <Continuous>    MEDICATIONS  (PRN):  acetaminophen   Tablet .. 650 milliGRAM(s) Oral every 6 hours PRN Mild Pain (1 - 3)  dextrose 40% Gel 15 Gram(s) Oral once PRN Blood Glucose LESS THAN 70 milliGRAM(s)/deciliter  glucagon  Injectable 1 milliGRAM(s) IntraMuscular once PRN Glucose LESS THAN 70 milligrams/deciliter  ondansetron Injectable 4 milliGRAM(s) IV Push every 6 hours PRN Nausea and/or Vomiting  oxyCODONE    IR 5 milliGRAM(s) Oral every 6 hours PRN Moderate Pain (4 - 6)        Objective:    Vitals: Vital Signs Last 24 Hrs  T(C): 36.6 (18 @ 06:02), Max: 36.8 (18 @ 23:30)  T(F): 97.9 (18 @ 06:02), Max: 98.3 (18 @ 23:30)  HR: 82 (18 @ 06:02) (79 - 87)  BP: 159/65 (18 @ 06:02) (146/68 - 177/58)  BP(mean): --  RR: 17 (18 @ 06:02) (16 - 18)  SpO2: 100% (18 @ 06:02) (97% - 100%)            I&O's Summary      PHYSICAL EXAM:  GENERAL: NAD  HEAD:  Atraumatic, Normocephalic  CHEST/LUNG: Clear to auscultation bilaterally; No rales or wheezing  HEART: Regular rate and rhythm; No murmurs, rubs, or gallops  ABDOMEN: Soft, nontender, nondistended  EXTREMITIES:  No clubbing, cyanosis, or edema  LYMPH: No lymphadenopathy noted  SKIN: No rashes or lesions  NERVOUS SYSTEM:  Alert & Oriented X3    LABS:      130<L>  |  93<L>  |  13  ----------------------------<  135<H>  4.3   |  24  |  0.61      130<L>  |  94<L>  |  8   ----------------------------<  111<H>  4.2   |  24  |  0.50      124<L>  |  87<L>  |  10  ----------------------------<  178<H>  4.8   |  24  |  0.52    Ca    9.4      27 Sep 2018 05:00  Ca    9.4      26 Sep 2018 15:51  Ca    9.9      26 Sep 2018 10:35  Phos  3.8       Mg     2.0         TPro  8.2  /  Alb  4.2  /  TBili  0.5  /  DBili  x   /  AST  18  /  ALT  15  /  AlkPhos  92                    Urinalysis Basic - ( 26 Sep 2018 10:35 )    Color: LIGHT YELLOW / Appearance: CLEAR / S.037 / pH: 8.0  Gluc: NEGATIVE / Ketone: TRACE  / Bili: NEGATIVE / Urobili: NORMAL   Blood: NEGATIVE / Protein: 30 / Nitrite: NEGATIVE   Leuk Esterase: NEGATIVE / RBC: 0-2 / WBC 0-2   Sq Epi: OCC / Non Sq Epi: x / Bacteria: NEGATIVE                              10.6   7.69  )-----------( 251      ( 27 Sep 2018 05:00 )             31.9                         11.8   10.21 )-----------( 306      ( 26 Sep 2018 10:35 )             34.3     CAPILLARY BLOOD GLUCOSE      POCT Blood Glucose.: 210 mg/dL (26 Sep 2018 22:06)  POCT Blood Glucose.: 167 mg/dL (26 Sep 2018 19:28)    RADIOLOGY:  Imaging Personally Reviewed: YES      Consultants involved in case: YES  Consultant(s) Notes Reviewed:  YES  Care Discussed with Consultants/Other Providers     Ella Suyeon Yu PGY-1 Ella Suyeon Yu PGY-1   [facility] Pager # [phone]  NS Pager # [phone]      Patient is a 68y old  Female who presents with a chief complaint of Abdominal Pain (26 Sep 2018 22:55)      SUBJECTIVE: No acute events overnight. Patient seen and examined at bedside. Pt slept well, no pain overnight, no vomiting.     REVIEW OF SYSTEMS:  CONSTITUTIONAL: No fever, weight loss, or fatigue  RESPIRATORY: No cough or shortness of breath  CARDIOVASCULAR: No chest pain, palpitations, dizziness, or leg swelling  GASTROINTESTINAL: No abdominal or epigastric pain. No nausea, vomiting, or hematemesis; No diarrhea or constipation. No melena or hematochezia.  GENITOURINARY: No dysuria  NEUROLOGICAL: No headaches  SKIN: No itching or lesions       MEDICATIONS  (STANDING):  carvedilol 12.5 milliGRAM(s) Oral every 12 hours  dextrose 5%. 1000 milliLiter(s) (50 mL/Hr) IV Continuous <Continuous>  dextrose 50% Injectable 12.5 Gram(s) IV Push once  dextrose 50% Injectable 25 Gram(s) IV Push once  dextrose 50% Injectable 25 Gram(s) IV Push once  docusate sodium 100 milliGRAM(s) Oral three times a day  enoxaparin Injectable 40 milliGRAM(s) SubCutaneous every 24 hours  influenza   Vaccine 0.5 milliLiter(s) IntraMuscular once  insulin lispro (HumaLOG) corrective regimen sliding scale   SubCutaneous three times a day before meals  insulin lispro (HumaLOG) corrective regimen sliding scale   SubCutaneous at bedtime  pantoprazole    Tablet 40 milliGRAM(s) Oral before breakfast  sodium chloride 0.9%. 1000 milliLiter(s) (75 mL/Hr) IV Continuous <Continuous>    MEDICATIONS  (PRN):  acetaminophen   Tablet .. 650 milliGRAM(s) Oral every 6 hours PRN Mild Pain (1 - 3)  dextrose 40% Gel 15 Gram(s) Oral once PRN Blood Glucose LESS THAN 70 milliGRAM(s)/deciliter  glucagon  Injectable 1 milliGRAM(s) IntraMuscular once PRN Glucose LESS THAN 70 milligrams/deciliter  ondansetron Injectable 4 milliGRAM(s) IV Push every 6 hours PRN Nausea and/or Vomiting  oxyCODONE    IR 5 milliGRAM(s) Oral every 6 hours PRN Moderate Pain (4 - 6)        Objective:    Vitals: Vital Signs Last 24 Hrs  T(C): 36.6 (18 @ 06:02), Max: 36.8 (18 @ 23:30)  T(F): 97.9 (18 @ 06:02), Max: 98.3 (18 @ 23:30)  HR: 82 (18 @ 06:02) (79 - 87)  BP: 159/65 (18 @ 06:02) (146/68 - 177/58)  BP(mean): --  RR: 17 (18 @ 06:02) (16 - 18)  SpO2: 100% (18 @ 06:02) (97% - 100%)            I&O's Summary      PHYSICAL EXAM:  GENERAL: NAD  HEAD:  Atraumatic, Normocephalic  CHEST/LUNG: Clear to auscultation bilaterally; No rales or wheezing  HEART: Regular rate and rhythm; No murmurs, rubs, or gallops  ABDOMEN: Soft, nontender, nondistended, mild tenderness in LLQ on deep palpation  EXTREMITIES:  No clubbing, cyanosis, or edema  LYMPH: No lymphadenopathy noted  SKIN: No rashes or lesions  NERVOUS SYSTEM:  Alert & Oriented X3    LABS:      130<L>  |  93<L>  |  13  ----------------------------<  135<H>  4.3   |  24  |  0.61      130<L>  |  94<L>  |  8   ----------------------------<  111<H>  4.2   |  24  |  0.50      124<L>  |  87<L>  |  10  ----------------------------<  178<H>  4.8   |  24  |  0.52    Ca    9.4      27 Sep 2018 05:00  Ca    9.4      26 Sep 2018 15:51  Ca    9.9      26 Sep 2018 10:35  Phos  3.8       Mg     2.0         TPro  8.2  /  Alb  4.2  /  TBili  0.5  /  DBili  x   /  AST  18  /  ALT  15  /  AlkPhos  92  -                  Urinalysis Basic - ( 26 Sep 2018 10:35 )    Color: LIGHT YELLOW / Appearance: CLEAR / S.037 / pH: 8.0  Gluc: NEGATIVE / Ketone: TRACE  / Bili: NEGATIVE / Urobili: NORMAL   Blood: NEGATIVE / Protein: 30 / Nitrite: NEGATIVE   Leuk Esterase: NEGATIVE / RBC: 0-2 / WBC 0-2   Sq Epi: OCC / Non Sq Epi: x / Bacteria: NEGATIVE                              10.6   7.69  )-----------( 251      ( 27 Sep 2018 05:00 )             31.9                         11.8   10.21 )-----------( 306      ( 26 Sep 2018 10:35 )             34.3     CAPILLARY BLOOD GLUCOSE      POCT Blood Glucose.: 210 mg/dL (26 Sep 2018 22:06)  POCT Blood Glucose.: 167 mg/dL (26 Sep 2018 19:28)    RADIOLOGY:  Imaging Personally Reviewed: YES      Consultants involved in case: YES  Consultant(s) Notes Reviewed:  YES  Care Discussed with Consultants/Other Providers     Ella Suyeon Yu PGY-1

## 2018-10-10 ENCOUNTER — APPOINTMENT (OUTPATIENT)
Dept: GASTROENTEROLOGY | Facility: HOSPITAL | Age: 68
End: 2018-10-10

## 2018-10-14 PROBLEM — C25.9 MALIGNANT NEOPLASM OF PANCREAS, UNSPECIFIED: Chronic | Status: ACTIVE | Noted: 2018-09-26

## 2018-12-27 RX ORDER — PANTOPRAZOLE 40 MG/1
40 TABLET, DELAYED RELEASE ORAL TWICE DAILY
Qty: 180 | Refills: 3 | Status: ACTIVE | COMMUNITY
Start: 2018-08-30 | End: 1900-01-01

## 2023-10-01 PROBLEM — K86.89 PANCREATIC MASS: Status: ACTIVE | Noted: 2018-08-30
